# Patient Record
Sex: FEMALE | Race: WHITE
[De-identification: names, ages, dates, MRNs, and addresses within clinical notes are randomized per-mention and may not be internally consistent; named-entity substitution may affect disease eponyms.]

---

## 2019-09-26 ENCOUNTER — HOSPITAL ENCOUNTER (OUTPATIENT)
Dept: HOSPITAL 53 - M LDO | Age: 29
Discharge: HOME | End: 2019-09-26
Attending: ADVANCED PRACTICE MIDWIFE
Payer: COMMERCIAL

## 2019-09-26 VITALS — SYSTOLIC BLOOD PRESSURE: 121 MMHG | DIASTOLIC BLOOD PRESSURE: 68 MMHG

## 2019-09-26 VITALS — DIASTOLIC BLOOD PRESSURE: 76 MMHG | SYSTOLIC BLOOD PRESSURE: 127 MMHG

## 2019-09-26 VITALS — WEIGHT: 194.01 LBS | HEIGHT: 68 IN | BODY MASS INDEX: 29.4 KG/M2

## 2019-09-26 VITALS — DIASTOLIC BLOOD PRESSURE: 64 MMHG | SYSTOLIC BLOOD PRESSURE: 130 MMHG

## 2019-09-26 DIAGNOSIS — O26.893: ICD-10-CM

## 2019-09-26 DIAGNOSIS — Z36.89: Primary | ICD-10-CM

## 2019-09-26 DIAGNOSIS — R03.0: ICD-10-CM

## 2019-09-26 DIAGNOSIS — Z3A.39: ICD-10-CM

## 2019-09-26 LAB
ALT SERPL W P-5'-P-CCNC: 15 U/L (ref 12–78)
APPEARANCE UR: CLEAR
BACTERIA UR QL AUTO: (no result)
BILIRUB SERPL-MCNC: 0.2 MG/DL (ref 0.2–1)
BILIRUB UR QL STRIP.AUTO: NEGATIVE
CREAT SERPL-MCNC: 0.59 MG/DL (ref 0.55–1.3)
CREAT UR-MCNC: 22.3 MG/DL
GFR SERPL CREATININE-BSD FRML MDRD: > 60 ML/MIN/{1.73_M2} (ref 60–?)
GLUCOSE UR QL STRIP.AUTO: NEGATIVE MG/DL
HCT VFR BLD AUTO: 35.5 % (ref 36–47)
HGB BLD-MCNC: 11.9 G/DL (ref 12–15.5)
HGB UR QL STRIP.AUTO: NEGATIVE
KETONES UR QL STRIP.AUTO: NEGATIVE MG/DL
LDH SERPL L TO P-CCNC: 227 U/L (ref 84–246)
LEUKOCYTE ESTERASE UR QL STRIP.AUTO: NEGATIVE
MCH RBC QN AUTO: 28.8 PG (ref 27–33)
MCHC RBC AUTO-ENTMCNC: 33.5 G/DL (ref 32–36.5)
MCV RBC AUTO: 86 FL (ref 80–96)
NITRITE UR QL STRIP.AUTO: NEGATIVE
PH UR STRIP.AUTO: 7 UNITS (ref 5–9)
PLATELET # BLD AUTO: 203 10^3/UL (ref 150–450)
PROT UR QL STRIP.AUTO: NEGATIVE MG/DL
PROT UR-MCNC: < 5 MG/DL (ref 0–12)
RBC # BLD AUTO: 4.13 10^6/UL (ref 4–5.4)
RBC # UR AUTO: 0 /HPF (ref 0–3)
SP GR UR STRIP.AUTO: 1 (ref 1–1.03)
SQUAMOUS #/AREA URNS AUTO: 0 /HPF (ref 0–6)
URATE SERPL-MCNC: 4.4 MG/DL (ref 2.6–6)
UROBILINOGEN UR QL STRIP.AUTO: 0.2 MG/DL (ref 0–2)
WBC # BLD AUTO: 11.8 10^3/UL (ref 4–10)
WBC #/AREA URNS AUTO: 0 /HPF (ref 0–3)

## 2019-09-26 PROCEDURE — 84450 TRANSFERASE (AST) (SGOT): CPT

## 2019-09-26 PROCEDURE — 83615 LACTATE (LD) (LDH) ENZYME: CPT

## 2019-09-26 PROCEDURE — 82570 ASSAY OF URINE CREATININE: CPT

## 2019-09-26 PROCEDURE — 84156 ASSAY OF PROTEIN URINE: CPT

## 2019-09-26 PROCEDURE — 59025 FETAL NON-STRESS TEST: CPT

## 2019-09-26 PROCEDURE — 81001 URINALYSIS AUTO W/SCOPE: CPT

## 2019-09-26 PROCEDURE — 82565 ASSAY OF CREATININE: CPT

## 2019-09-26 PROCEDURE — 84550 ASSAY OF BLOOD/URIC ACID: CPT

## 2019-09-26 PROCEDURE — 84460 ALANINE AMINO (ALT) (SGPT): CPT

## 2019-09-26 PROCEDURE — 85027 COMPLETE CBC AUTOMATED: CPT

## 2019-09-26 PROCEDURE — 82247 BILIRUBIN TOTAL: CPT

## 2019-09-26 NOTE — IPNPDOC
Obstetrical Progress Note


Date of Service


Sep 26, 2019





Subjective


Late entry.





28yo  at 39+2wks sent from clinic r/o Pre-Eclampsia. Pt was seen for SHERLYN and

noted to have 4x mild range BPs, asymptomatic. 


Pt's pregnancy has thus far been uncomplicated, GBS Negative. 





Upon arrival to triage, pt reports +FM, denies LOF/VB/CTX. 


She denies HA, visual disturbances or RUQ pain.





Objective


O:


VSS, BPs normotensive (121-130/64-76), afebrile


FHR Tracing:


Initially, FHR tachycardic and unable to determine baseline; fetus very active 

during monitoring, both by palpation, audible, and pt report


Pt received 1L LR bolus and FHR settled on baseline of 135 with moderate 

variability, + accels, no decels; tracing reviewed with Dr. Frazier


Irregular contractions after LR bolus started, pt feeling uncomfortable


VE: C/L/H (chaperoned by EARLENE Sosa)





Pre-E labs collected and all WNL








Vital Signs








  Date Time  Temp Pulse Resp B/P (MAP) Pulse Ox O2 Delivery O2 Flow Rate FiO2


 


19 12:37  77  130/64 (86)    


 


19 11:23   18     


 


19 11:16 98.7    99   











Assessment and Plan


Fetal Status:  Reassuring


Group B Streptococcus:  Negative


Additional Comments


A: 28yo  at 39+2wks with WNL Pre-E labs and reassuring fetal status, 

Category I FHT


Pt possibly in early labor





P: Pt discharged home with strict Pre-E precautions


Pt to f/u in clinic on Monday for repeat BP check; has SHERLYN scheduled for 40+2wks

on 


Labor/danger/return precautions reviewed











HARRY LIM CNM          Sep 26, 2019 18:11

## 2019-09-28 ENCOUNTER — HOSPITAL ENCOUNTER (INPATIENT)
Dept: HOSPITAL 53 - M LDO | Age: 29
LOS: 2 days | Discharge: HOME | End: 2019-09-30
Attending: GENERAL PRACTICE | Admitting: OBSTETRICS & GYNECOLOGY
Payer: COMMERCIAL

## 2019-09-28 VITALS — SYSTOLIC BLOOD PRESSURE: 117 MMHG | DIASTOLIC BLOOD PRESSURE: 55 MMHG

## 2019-09-28 VITALS — DIASTOLIC BLOOD PRESSURE: 57 MMHG | SYSTOLIC BLOOD PRESSURE: 121 MMHG

## 2019-09-28 VITALS — SYSTOLIC BLOOD PRESSURE: 129 MMHG | DIASTOLIC BLOOD PRESSURE: 71 MMHG

## 2019-09-28 VITALS — SYSTOLIC BLOOD PRESSURE: 166 MMHG | DIASTOLIC BLOOD PRESSURE: 103 MMHG

## 2019-09-28 VITALS — SYSTOLIC BLOOD PRESSURE: 115 MMHG | DIASTOLIC BLOOD PRESSURE: 57 MMHG

## 2019-09-28 VITALS — SYSTOLIC BLOOD PRESSURE: 117 MMHG | DIASTOLIC BLOOD PRESSURE: 59 MMHG

## 2019-09-28 VITALS — SYSTOLIC BLOOD PRESSURE: 151 MMHG | DIASTOLIC BLOOD PRESSURE: 67 MMHG

## 2019-09-28 VITALS — SYSTOLIC BLOOD PRESSURE: 120 MMHG | DIASTOLIC BLOOD PRESSURE: 60 MMHG

## 2019-09-28 VITALS — SYSTOLIC BLOOD PRESSURE: 145 MMHG | DIASTOLIC BLOOD PRESSURE: 71 MMHG

## 2019-09-28 VITALS — DIASTOLIC BLOOD PRESSURE: 68 MMHG | SYSTOLIC BLOOD PRESSURE: 136 MMHG

## 2019-09-28 VITALS — DIASTOLIC BLOOD PRESSURE: 66 MMHG | SYSTOLIC BLOOD PRESSURE: 119 MMHG

## 2019-09-28 VITALS — DIASTOLIC BLOOD PRESSURE: 78 MMHG | SYSTOLIC BLOOD PRESSURE: 111 MMHG

## 2019-09-28 VITALS — DIASTOLIC BLOOD PRESSURE: 64 MMHG | SYSTOLIC BLOOD PRESSURE: 127 MMHG

## 2019-09-28 VITALS — DIASTOLIC BLOOD PRESSURE: 73 MMHG | SYSTOLIC BLOOD PRESSURE: 126 MMHG

## 2019-09-28 VITALS — DIASTOLIC BLOOD PRESSURE: 64 MMHG | SYSTOLIC BLOOD PRESSURE: 130 MMHG

## 2019-09-28 VITALS — DIASTOLIC BLOOD PRESSURE: 62 MMHG | SYSTOLIC BLOOD PRESSURE: 107 MMHG

## 2019-09-28 VITALS — DIASTOLIC BLOOD PRESSURE: 78 MMHG | SYSTOLIC BLOOD PRESSURE: 137 MMHG

## 2019-09-28 VITALS — SYSTOLIC BLOOD PRESSURE: 134 MMHG | DIASTOLIC BLOOD PRESSURE: 72 MMHG

## 2019-09-28 VITALS — SYSTOLIC BLOOD PRESSURE: 125 MMHG | DIASTOLIC BLOOD PRESSURE: 59 MMHG

## 2019-09-28 VITALS — DIASTOLIC BLOOD PRESSURE: 64 MMHG | SYSTOLIC BLOOD PRESSURE: 160 MMHG

## 2019-09-28 VITALS — SYSTOLIC BLOOD PRESSURE: 132 MMHG | DIASTOLIC BLOOD PRESSURE: 81 MMHG

## 2019-09-28 VITALS — SYSTOLIC BLOOD PRESSURE: 132 MMHG | DIASTOLIC BLOOD PRESSURE: 77 MMHG

## 2019-09-28 VITALS — DIASTOLIC BLOOD PRESSURE: 55 MMHG | SYSTOLIC BLOOD PRESSURE: 117 MMHG

## 2019-09-28 VITALS — SYSTOLIC BLOOD PRESSURE: 126 MMHG | DIASTOLIC BLOOD PRESSURE: 66 MMHG

## 2019-09-28 VITALS — DIASTOLIC BLOOD PRESSURE: 67 MMHG | SYSTOLIC BLOOD PRESSURE: 131 MMHG

## 2019-09-28 VITALS — SYSTOLIC BLOOD PRESSURE: 87 MMHG | DIASTOLIC BLOOD PRESSURE: 46 MMHG

## 2019-09-28 VITALS — HEIGHT: 68 IN | WEIGHT: 193.79 LBS | BODY MASS INDEX: 29.37 KG/M2

## 2019-09-28 VITALS — SYSTOLIC BLOOD PRESSURE: 137 MMHG | DIASTOLIC BLOOD PRESSURE: 87 MMHG

## 2019-09-28 VITALS — DIASTOLIC BLOOD PRESSURE: 56 MMHG | SYSTOLIC BLOOD PRESSURE: 114 MMHG

## 2019-09-28 VITALS — DIASTOLIC BLOOD PRESSURE: 61 MMHG | SYSTOLIC BLOOD PRESSURE: 113 MMHG

## 2019-09-28 VITALS — SYSTOLIC BLOOD PRESSURE: 135 MMHG | DIASTOLIC BLOOD PRESSURE: 77 MMHG

## 2019-09-28 VITALS — SYSTOLIC BLOOD PRESSURE: 118 MMHG | DIASTOLIC BLOOD PRESSURE: 58 MMHG

## 2019-09-28 VITALS — SYSTOLIC BLOOD PRESSURE: 140 MMHG | DIASTOLIC BLOOD PRESSURE: 78 MMHG

## 2019-09-28 VITALS — SYSTOLIC BLOOD PRESSURE: 129 MMHG | DIASTOLIC BLOOD PRESSURE: 72 MMHG

## 2019-09-28 VITALS — DIASTOLIC BLOOD PRESSURE: 56 MMHG | SYSTOLIC BLOOD PRESSURE: 138 MMHG

## 2019-09-28 VITALS — SYSTOLIC BLOOD PRESSURE: 124 MMHG | DIASTOLIC BLOOD PRESSURE: 63 MMHG

## 2019-09-28 VITALS — DIASTOLIC BLOOD PRESSURE: 70 MMHG | SYSTOLIC BLOOD PRESSURE: 158 MMHG

## 2019-09-28 VITALS — SYSTOLIC BLOOD PRESSURE: 144 MMHG | DIASTOLIC BLOOD PRESSURE: 78 MMHG

## 2019-09-28 VITALS — DIASTOLIC BLOOD PRESSURE: 64 MMHG | SYSTOLIC BLOOD PRESSURE: 114 MMHG

## 2019-09-28 VITALS — DIASTOLIC BLOOD PRESSURE: 54 MMHG | SYSTOLIC BLOOD PRESSURE: 114 MMHG

## 2019-09-28 VITALS — SYSTOLIC BLOOD PRESSURE: 123 MMHG | DIASTOLIC BLOOD PRESSURE: 56 MMHG

## 2019-09-28 VITALS — DIASTOLIC BLOOD PRESSURE: 58 MMHG | SYSTOLIC BLOOD PRESSURE: 109 MMHG

## 2019-09-28 VITALS — SYSTOLIC BLOOD PRESSURE: 132 MMHG | DIASTOLIC BLOOD PRESSURE: 66 MMHG

## 2019-09-28 DIAGNOSIS — Z3A.39: ICD-10-CM

## 2019-09-28 LAB
HCT VFR BLD AUTO: 34.4 % (ref 36–47)
HGB BLD-MCNC: 11.6 G/DL (ref 12–15.5)
MCH RBC QN AUTO: 28.9 PG (ref 27–33)
MCHC RBC AUTO-ENTMCNC: 33.7 G/DL (ref 32–36.5)
MCV RBC AUTO: 85.6 FL (ref 80–96)
PLATELET # BLD AUTO: 196 10^3/UL (ref 150–450)
RBC # BLD AUTO: 4.02 10^6/UL (ref 4–5.4)
WBC # BLD AUTO: 13.4 10^3/UL (ref 4–10)

## 2019-09-28 RX ADMIN — Medication SCH MLS/HR: at 07:53

## 2019-09-28 RX ADMIN — SODIUM CHLORIDE, POTASSIUM CHLORIDE, SODIUM LACTATE AND CALCIUM CHLORIDE SCH MLS/HR: 600; 310; 30; 20 INJECTION, SOLUTION INTRAVENOUS at 07:45

## 2019-09-28 RX ADMIN — SODIUM CHLORIDE, POTASSIUM CHLORIDE, SODIUM LACTATE AND CALCIUM CHLORIDE SCH MLS/HR: 600; 310; 30; 20 INJECTION, SOLUTION INTRAVENOUS at 15:44

## 2019-09-28 RX ADMIN — Medication PRN MG: at 08:29

## 2019-09-28 RX ADMIN — IBUPROFEN PRN MG: 800 TABLET, FILM COATED ORAL at 18:34

## 2019-09-28 RX ADMIN — Medication PRN MG: at 08:25

## 2019-09-28 RX ADMIN — SODIUM CHLORIDE, PRESERVATIVE FREE SCH ML: 5 INJECTION INTRAVENOUS at 21:29

## 2019-09-28 RX ADMIN — SODIUM CHLORIDE, POTASSIUM CHLORIDE, SODIUM LACTATE AND CALCIUM CHLORIDE SCH MLS/HR: 600; 310; 30; 20 INJECTION, SOLUTION INTRAVENOUS at 11:39

## 2019-09-28 RX ADMIN — Medication PRN MG: at 07:58

## 2019-09-28 RX ADMIN — Medication SCH MLS/HR: at 15:38

## 2019-09-28 NOTE — IPNPDOC
Text Note


Date of Service


The patient was seen on 9/28/19.





NOTE


come to assess patient due to concern for vaginal swelling.  patient has not 

urinate since delivery.  she is able to ambulate now, and tried recently and 

unable to urinate.





exam with nurse chaperone:





perineum with bilateral labia minora edema.  no vaginal bulge palpation, no 

concern for hematoma at this point. 





discussed with patient and nurse to continue with icepack and motrin for pain as

needed.  will place alvarez overnight while waiting for edema to improve.  plan to

remove alvarez in AM and wait for spontaneous void. 





DO MARIA ISABEL





VS,Fishbone, I+O


VS, Fishbone, I+O


Laboratory Tests


9/28/19 06:39








Red Blood Count 4.02, Mean Corpuscular Volume 85.6, Mean Corpuscular Hemoglobin 

28.9, Mean Corpuscular Hemoglobin Concent 33.7, Red Cell Distribution Width 13.2








Vital Signs








  Date Time  Temp Pulse Resp B/P (MAP) Pulse Ox O2 Delivery O2 Flow Rate FiO2


 


9/28/19 18:39  92 20 137/87 (104)    


 


9/28/19 18:00 98.2    98   

















TRIPP NICOLAS DO                  Sep 28, 2019 19:46

## 2019-09-28 NOTE — HPEPDOC
Obstetrical History & Physical


General


Date of Admission


Sep 28, 2019 at 06:38


Primary Care Physician:  TRIPP NICOLAS DO





History of Present Illness


patient is a 28 yo  @ 39+4wks by sure lmp c/w 1st trimester US PADILLA() 

presents with regular painful contractions.  she was checked to be 4-5cm.  

Admitted and had epidural placed for pain management.  Currently without 

concerns.


Chief Complaint:  Contractions, term


Information Provided By:  Patient


Age:  29


:  2


Term:  1


Livin





Prenatal Care


Prenatal Care:  [facility]





Prenatal Dating


Final EDC:  Oct 1, 2019


Final EDC for Daily Update:  Oct 1, 2019


Final EDC by:  LMP, 1st trimester (US)


LMP:  Dec 25, 2018 ([id number])





Antepartum Course


Prenatal Diagnos(e)s


uncomplicated





Past Medical History


Past Obstetrical History :  


   Past Obstetrical History:  Primgravida


   Type of Delivery:  Spontaneous Vaginal Del.


   Weight of Infant  (grams):  2800


   Complications:  No


GYN History:  Human papillomavirus(HPV), History of STD, Other (HELGA in )


Past Medical History


Surgical History:  Other (HELGA )





Family History


Significant Family History:  No pertinent family hx





Social History


Marital Status:  


Psychosocial History:  No pertinent psych hx


* Smoker:  non-smoker


Alcohol:  Denies


Drugs:  denies





Abuse Violence Screening


Have you been hit/kicked/slapp:  No


Have you been sexually assault:  No





Prenatal Imunizations


Tdap status:  current


Influenza Status:  needs





Allergies


Coded Allergies:  


     Penicillins (Verified  Allergy, Mild, HIVES, 19)





Medications


Scheduled


Prenatal No.137/Iron/Folic Acd (Prenatal Vitamin Tablet) 1 Each Tablet, 1 TAB PO

DAILY





Physical Examination


Physical Examination


GENERAL: Alert and oriented times three.


ABDOMEN: Gravid and non-tender to touch.


FETUS: fetus is vertex (VTX) by Leopold.


HEART: s1s2 s m/g/c


LUNGS: Clear to auscultation (CTA).


EXTREMITIES: No edema/erythema/tenderness





Vital Signs/I&O





Vital Signs








  Date Time  Temp Pulse Resp B/P (MAP) Pulse Ox O2 Delivery O2 Flow Rate FiO2


 


19 06:03 98.6 91 18 129/71 (90)    











Laboratory Data


24H LABS


Laboratory Tests 2


19 06:39: Nucleated Red Blood Cells % (auto) 0.0


CBC/BMP


Laboratory Tests


19 06:39








Red Blood Count 4.02, Mean Corpuscular Volume 85.6, Mean Corpuscular Hemoglobin 

28.9, Mean Corpuscular Hemoglobin Concent 33.7, Red Cell Distribution Width 13.2





Pertinent Laboratoy Data


Blood Type:  O+


RBC Antibody Screen:  Negative


HIV:  Negative


Hepatitis B:  Negative


Rapid Plasma Reagin:  Nonreactive


Rubella:  Immune


Varicella:  Immune


Chlamydia/Gonorrhea:  Negative


Group B Streptococcus:  Negative





Anatomy Ultrasound


Ultrasound Date:  May 14, 2019


Placenta Location:  Posterior


Normal Anatomy:  Yes


Placenta Previa:  No





Vaginal Examination


Dilation:  5 cm


Effacement:  70%


Station:  -2


Fetal Presentation:  Cephalic presentation





Fetal Assessment


Fetal Heart Rate (FHR):  135


Variability:  Moderate


Accelerations:  Positive


Decelerations:  None





Tocometer


Contractions:  Yes


Frequency:  regular, every 2-5 min.





Assessment/Plan


Assessment


patient is a 28 yo  @ 39+4wks gestation in labor.





Plan


Admit to l&d for labor.


 and consent for l&D care and blood products.


Diet: CLEAR


Group B Streptococcus (GBS) [negative].


Labs and intravenous (IV) per unit protocol.


Counseled on Pitocin and induction of labor (IOL).


Anticipate [normal spontaneous delivery ()].


C-S as appropriate.





Labor and Delivery Counseling


Discussed risk of emergent  delivery, infection, vaginal laceration,  

episiotomy, operative vaginal delivery (forceps/vacuum), bleeding requiring 

blood transfusion.











TRIPP NICOLAS DO                  Sep 28, 2019 09:56

## 2019-09-28 NOTE — DNPDOC
[facility] [place]ivery Note


DATE OF DELIVERY: 2019





PREDELIVERY DIAGNOSIS: 39+ weeks' gestation and labor. 





POST DELIVERY DIAGNOSIS: Delivered.





PROCEDURE: Spontaneous vaginal delivery





OBSTETRICIAN: Edu Nassar DO





ANESTHESIA: epidural





ESTIMATED BLOOD LOSS: 250 mL.





FINDINGS: [8] pound [1] ounce (3660gm) infant, Apgar Score [7]/[9], nuchal cord 

times [1].





DELIVERY SUMMARY: Patient pushed at c/c/+2 station.  Baby checked to be LOT.  

Baby rotated OA.  With good maternal effort, baby delivered OA,  Restituted ROT.

  Nuchal cord x 1, reduced.  Anterior shoulder delivered followed by posterior 

shoulder, body followed with ease. baby placed on maternal abdomen,  cord 

allowed to stop pulsating.  cord clamped x 2 and cut by FOB.  pitocin bolus 

started.  placenta delivered spontaneously, fundus massaged firm.  inspection 

revealed no laceration.  sponge count correct x 2.  ebl 250cc.  baby and mother 

bonding when I left the room. 








DO MARIA ISABEL Reed LUAT N. DO                  Sep 28, 2019 16:49

## 2019-09-28 NOTE — IPNPDOC
Text Note


Date of Service


The patient was seen on 9/28/19.





NOTE


patient feeling pressure to push.





fht: cat I


toco:  ctx q 2-3mins


ce: c/c/+2.





patient in second stage, start pushing.





DO Brianna





VS,Merary, I+O


VS, Fishbone, I+O


Laboratory Tests


9/28/19 06:39








Red Blood Count 4.02, Mean Corpuscular Volume 85.6, Mean Corpuscular Hemoglobin 

28.9, Mean Corpuscular Hemoglobin Concent 33.7, Red Cell Distribution Width 13.2








Vital Signs








  Date Time  Temp Pulse Resp B/P (MAP) Pulse Ox O2 Delivery O2 Flow Rate FiO2


 


9/28/19 12:07  90  137/78 (97)    


 


9/28/19 11:39   20     


 


9/28/19 11:27 98.5       


 


9/28/19 08:33     98   

















TRIPP NICOLAS DO                  Sep 28, 2019 14:56

## 2019-09-28 NOTE — IPNPDOC
Text Note


Date of Service


The patient was seen on 9/28/19.





NOTE


patient is comfortable with epidural, feeling pressure.





vitals: normal


NAD





fht: 135/mod jagjit/pos accel/no decel


toco:  ctx q 2-3mins


ce: anterior lip/90/0, arom





attempt to reduce anterior lip for patient to start pushing.  She pushed for 

about 15minutes, recheck and shows anterior lip had come back.  





a/p


patient in active labor, will recheck in 1-2hrs.  start pushing when patient is 

complete. 





DO Brianna





VS,Sadie, I+O


VS, Fishbone, I+O


Laboratory Tests


9/28/19 06:39








Red Blood Count 4.02, Mean Corpuscular Volume 85.6, Mean Corpuscular Hemoglobin 

28.9, Mean Corpuscular Hemoglobin Concent 33.7, Red Cell Distribution Width 13.2








Vital Signs








  Date Time  Temp Pulse Resp B/P (MAP) Pulse Ox O2 Delivery O2 Flow Rate FiO2


 


9/28/19 12:07  90  137/78 (97)    


 


9/28/19 11:39   20     


 


9/28/19 11:27 98.5       


 


9/28/19 08:33     98   

















TRIPP NICOLAS DO                  Sep 28, 2019 13:37

## 2019-09-29 VITALS — SYSTOLIC BLOOD PRESSURE: 139 MMHG | DIASTOLIC BLOOD PRESSURE: 77 MMHG

## 2019-09-29 VITALS — DIASTOLIC BLOOD PRESSURE: 76 MMHG | SYSTOLIC BLOOD PRESSURE: 119 MMHG

## 2019-09-29 RX ADMIN — SODIUM CHLORIDE, PRESERVATIVE FREE SCH ML: 5 INJECTION INTRAVENOUS at 22:16

## 2019-09-29 RX ADMIN — Medication SCH TAB: at 10:03

## 2019-09-29 RX ADMIN — SODIUM CHLORIDE, PRESERVATIVE FREE SCH ML: 5 INJECTION INTRAVENOUS at 14:07

## 2019-09-29 RX ADMIN — SODIUM CHLORIDE, PRESERVATIVE FREE SCH ML: 5 INJECTION INTRAVENOUS at 05:34

## 2019-09-29 RX ADMIN — IBUPROFEN PRN MG: 800 TABLET, FILM COATED ORAL at 14:55

## 2019-09-29 RX ADMIN — IBUPROFEN PRN MG: 800 TABLET, FILM COATED ORAL at 06:43

## 2019-09-29 NOTE — IPNPDOC
Postpartum Progress Note


Date of Service:  Sep 29, 2019


Postpartum Day#:  1


Postpartum Progress Note


SUBJECT: patient is a 28 yo  S/P  ppd #1.  alvarez placed back postpartum 

due to difficulty voiding and labial swelling.  patient is amublating, and 

tolerating po without problem. today.  Desires NorQD for contraceptive.





chaperone: patient's nurse





OBJECTIVE: 


VITAL SIGNS: Within normal limits, afebrile.


Heart rate: Regular rate and rhythm, no murmurs, rubs or gallops.


Abdomen: Fundus firm at U-2. Soft, NTTP.


labia minora swelling improved


lochia minimal





ASSESSMENT: patient is ppd #1, doing well.  





PLAN:


1. Alvarez out this AM with due to void in 4-6hrs


2. encourage bf and ambulating


3. norqd for contraceptive


4. routine pp care


5. dc home today pending voiding freely.





DO blaise





VS, I&O, 24H, Fishbone


Vital Signs/I&O





Vital Signs








  Date Time  Temp Pulse Resp B/P (MAP) Pulse Ox O2 Delivery O2 Flow Rate FiO2


 


19 06:00 98.2 78 16 119/76 (90) 98   














I&O- Last 24 Hours up to 6 AM 


 


 19





 05:59


 


Intake Total 4220 ml


 


Output Total 4677 ml


 


Balance -457 ml











Laboratory Data


24H LABS


Laboratory Tests 2


19 16:37: Serology Scanned Report Hepatitis B Testing











TRIPP NICOLAS DO                  Sep 29, 2019 08:20

## 2019-09-30 VITALS — DIASTOLIC BLOOD PRESSURE: 84 MMHG | SYSTOLIC BLOOD PRESSURE: 129 MMHG

## 2019-09-30 RX ADMIN — Medication SCH TAB: at 08:19

## 2019-09-30 RX ADMIN — IBUPROFEN PRN MG: 800 TABLET, FILM COATED ORAL at 05:14

## 2019-09-30 NOTE — IPNPDOC
Postpartum Progress Note


Date of Service:  Sep 30, 2019


Postpartum Day#:  2


Postpartum Progress Note


SUBJECT: patient is a 30 yo  S/P  ppd #2.  patient is amublating, urina

ting and tolerating po without problem today.  breast feeding without problem.  

Desires NorQD for contraceptive.





chaperone: patient's nurse





OBJECTIVE: 


VITAL SIGNS: Within normal limits, afebrile.


Abdomen: Fundus firm at U-2. Soft, NTTP.


labia minora swelling resolved


lochia minimal





ASSESSMENT: patient is ppd #2, doing well.  





PLAN:


1. discharge home today


2. encourage bf and ambulating


3. norqd for contraceptive


4. routine pp care





blaise, DO





VS, I&O, 24H, Fishbone


Vital Signs/I&O





Vital Signs








  Date Time  Temp Pulse Resp B/P (MAP) Pulse Ox O2 Delivery O2 Flow Rate FiO2


 


19 06:11 98.3 69 20 129/84 (99) 100   














I&O- Last 24 Hours up to 6 AM 


 


 19





 05:59


 


Output Total 1600 ml


 


Balance -1600 ml

















TRIPP NICOLAS DO                  Sep 30, 2019 08:28

## 2019-09-30 NOTE — OBDS
[facility] Obstetrical Discharge Sum.


Obstetrical Discharge Summary


Obstetrician/Provider:  TRIPP NICOLAS DO


:  2


Term:  1


Pre-term:  0


Abortions:  0


Livin


VDRL:  ABO Blood Group (O)


Rh:  Negative


Rubella:  Immune


Infant Sex:  Male


Infant Weight:  pounds (8), ounces (1), grams (3660)


Anesthesia:  Regional Anesthesia





A/P, Post Partum Course


List any complications


Admission diagnosis: GRAVID


Discharge diagnosis: postpartum


Condition at Discharge: stable


Discharge Instructions: [Home]


Activity: at [place]


Diet: regular


Medications:  at [place]


Follow-up: 6wks postpartum





Hospital course:


Patient admitted for labor, received epidural for pain management.  She 

progressed to deliver vaginally.  Postpartum course uncomplicated and she meets 

discharge criteria on postpartum day #2.











TRIPP NICOLAS DO                  Sep 28, 2019 16:54